# Patient Record
(demographics unavailable — no encounter records)

---

## 2017-03-27 NOTE — ED PDOC
HPI: General Adult


Time Seen by Provider: 17 18:13


Chief Complaint (Nursing): Dizziness/Lightheaded


Chief Complaint (Provider): Weakness/Vomiting


History Per: Patient


History/Exam Limitations: no limitations


Have you had recent travel within the past 21 days to any of the following 

countries: [place], [place], [place] or [place]?: No


Current Symptoms Are (Timing): Still Present


Additional Complaint(s): 


18:13





Theodore Patel is a 70 year old female with a history of hypertension, diabetes

, and uterine cancer, as well as a past surgical history of cholecystectomy 

presents to the ED with a chief complaint of vomiting, three episodes of which 

she experienced today. Her associated symptoms include headache, diffuse 

myalgias, lightheadedness, malaise, fatigue, and fever. She states that she has 

yet to taken any medications for her symptoms, denies any sick contacts or 

recent travel, as well as denies diarrhea, urinary symptoms, or respiratory 

symptoms. 





Past Medical History


Reviewed: Historical Data, Nursing Documentation, Vital Signs


Vital Signs: 


 Last Vital Signs











Temp  99 F   17 06:41


 


Pulse  83   17 09:46


 


Resp  20   17 09:46


 


BP  110/56 L  17 09:46


 


Pulse Ox  98   17 09:46














- Medical History


PMH: Diabetes, HTN


   Denies: HIV


Other PMH: uterine CA





- Surgical History


Surgical History: Cholecystectomy





- Family History


Family History: States: No Known Family Hx





- Social History


Current smoker - smoking cessation education provided: No


Alcohol: None


Drugs: Denies





- Home Medications


Home Medications: 


 Ambulatory Orders











 Medication  Instructions  Recorded


 


Ergocalciferol (Vitamin D2) 50,000 unit PO QWK 17





[Vitamin D2]  


 


Lisinopril [Zestril] 5 mg PO DAILY 17


 


Metformin HCl [Glucophage] 1,000 mg PO DAILY 17


 


Simvastatin [Zocor] 40 mg PO DAILY 17


 


Vitamin A/Vitamin D2 [Norwegian 50,000 mg PO DAILY 17





Cod Liver Oil Sfgl]  














- Allergies


Allergies/Adverse Reactions: 


 Allergies











Allergy/AdvReac Type Severity Reaction Status Date / Time


 


No Known Allergies Allergy   Verified 10/25/14 20:42














Review of Systems


Constitutional: Positive for: Fever, Weakness, Malaise, Other (fatigue,, 

diffuse myalgias)


Cardiovascular: Positive for: Light Headedness


Respiratory: Negative for: Other (no respiratory symptoms)


Gastrointestinal: Positive for: Vomiting (x3).  Negative for: Diarrhea


Genitourinary Female: Negative for: Other (no urinary symptoms)





Physical Exam





- Reviewed


Nursing Documentation Reviewed: Yes


Vital Signs Reviewed: Yes





- Physical Exam


Appears: Positive for: Uncomfortable, In Acute Distress


Head Exam: Positive for: ATRAUMATIC, NORMOCEPHALIC


Skin: Positive for: Warm, Dry.  Negative for: Rash


ENT: Positive for: Pharynx Is (clear), Other (dry mucous membranes)


Neck: Positive for: Painless ROM, Supple


Cardiovascular/Chest: Positive for: Chest Non Tender, Tachycardia (tachycardic 

with regular rhythm).  Negative for: Edema, Murmur


Respiratory: Positive for: Normal Breath Sounds (cleared to auscultation 

bilaterally).  Negative for: Rales, Rhonchi, Wheezing


Gastrointestinal/Abdominal: Positive for: Soft.  Negative for: Tenderness, Mass

, Distended, Guarding, Rebound


Back: Positive for: Normal Inspection.  Negative for: L CVA Tenderness, R CVA 

Tenderness


Extremity: Positive for: Normal ROM.  Negative for: Pedal Edema, Deformity


Lymphatic: Negative for: Adenopathy


Neurologic/Psych: Positive for: Alert, Oriented (x3).  Negative for: Motor/

Sensory Deficits





- Laboratory Results


Result Diagrams: 


 17 17:30





 17 08:10





- ECG


O2 Sat by Pulse Oximetry: 98 (RA)


Pulse Ox Interpretation: Normal





Medical Decision Making


Medical Decision Makin:30





Initial Impression: Vomiting and Fever vs. Sepsis vs. Dehydration vs. Gastritis 

vs. Gastroenteritis vs. Pancreatitis vs. Electrolyte Abnormalities vs. 

Hyperglycemia vs. DKA





Initial Plan:


* CBC


* Glucose


* Blood culture


* POC


* Urine culture


* Urinalysis


* Sodium Chloride 1000 mL at 1000 mLs/hr


* Tylenol 975 mg PO


* Zofran 8 mg IV


* Chest X-Ray


* EKG


* Reevaluation


* 


 Accession No. : [id number]


Patient Name / ID : GISELE JAIMES  / [id number]


Exam Date : 2017 22:10:26 ( Approved )


Study Comment : 


Sex / Age : F  / 070Y





Creator : TRAVON LARES


Dictator : 


 : 


Approver : TRAVON LARES


Approver2 : 





Report Date : 2017 22:39:00


My Comment : 


********************************************************************************

***





[facility] Division of Radiology  


 [address] [place] [address]  


 Tel. no. [phone]   


 


 


Patient Name: THEODORE PATEL            Account #: [id number]  


Pt. Address: [address] [place] [address] Rec #: [id number]  


                    [place] [address]            Ordering Dr: Edgard OQUENDO,

Ksenia LUCAS  


Pt Phone: [phone] CELL                             Order Location: [facility]  


: 1947 Female Age: 70            Order #: [id number]                   

                   


             Accession #: [id number]  


Reason for exam: vomiting   


 


 


 


 


 


 CT Scan  


 


 


ABD   PELVIS IV CONTRAST ONLY                              Exam Date: 17  


 


This imaging exam was performed at [facility]  


 


 


EXAM:  


   CT Abdomen and Pelvis With Intravenous Contrast.  


 


 CLINICAL HISTORY:  


   70 years old, female; Signs and symptoms; Nausea and vomiting; Prior surgery

;   


 Surgery date: 6+ months; Surgery type: Uterine ca. Cholecystectomy  


 


 TECHNIQUE:  


   Axial computed tomography images of the abdomen and pelvis with intravenous 

  


 contrast.  This CT exam was performed using one or more of the following dose 

  


 reduction techniques:  automated exposure control, adjustment of the mA and/or

   


 kV according to patient size, and/or use of iterative reconstruction 

technique.  


   Coronal and sagittal reformatted images were created and reviewed.  


 


 CONTRAST:  


   95 mL of [id number] administered intravenously.  


 


 COMPARISON:  


   CT - ABD   PELVIS PO CONTRAST ONLY 2014 8:44:55 PM  


 


 FINDINGS:  


   Lower thorax:  The heart as visualized appears mildly enlarged.  Mild   


 atelectasis and/or scarring in the lung bases.  


 


  ABDOMEN:  


   Liver:  There is a diffuse decrease in hepatic parenchymal density,   


 consistent with fatty infiltration.  There are no focal liver lesions present.

  


   Gallbladder and bile ducts:  There has been a cholecystectomy.  No ductal   


 dilation.  


   Pancreas:  1.6 CM cystic lesion in the body of the pancreas on series 3,   


 image 46.  However, this is little changed from 2014.  This suggests a   


 benign lesion.  The pancreas is somewhat atrophic and slightly fatty replaced.

  


   Spleen:  The spleen is normal.  


   Adrenals:  The adrenal glands are normal.  


   Kidneys and ureters:  The kidneys are normal.  No hydronephrosis.  


   Stomach and bowel:  Stomach is decompressed.  Colonic constipation is   


 present.  There is no evidence of intestinal obstruction.  No mucosal   


 thickening.  


   Appendix:  A normal appendix is identified.  


 


  PELVIS:  


   Bladder:  The bladder is normal.  


   Reproductive:  Uterus is atrophic.  


 


  ABDOMEN and PELVIS:  


   Intraperitoneal space:  There is no evidence of free intraperitoneal fluid. 

   


 There is no free intraperitoneal air.  


   Bones/joints:  There are moderate degenerative changes present.  There is   


 moderate diffuse osteopenia.  No acute fracture.  No dislocation.  


   Soft tissues:  There are small, left greater than right fat containing   


 inguinal hernias.  


   Vasculature:  The aorta is normal.  No abdominal aortic aneurysm.  


   Lymph nodes:  There are borderline bilateral inguinal nodes without sondra   


 adenopathy.  There is no evidence of lymphadenopathy.  


 


 IMPRESSION:       


 1.  The heart as visualized appears mildly enlarged.    


 2.  1.6 CM cystic lesion in the body of the pancreas on series 3, image 46.    


 However, this is little changed from 2014.  This suggests a benign 

lesion.   


 


 3.  Additional incidental and/or chronic findings as described.  


 


                                                            Dictated By:  

Travon Lares MD, MD      


                                                            Dictated Date/Time:

  17  


                                                            Signed By:  Travon Lares MD  


                                                            Date Signed: 2239  


                                                Transcribed By: UMAIR  


                                                            Transcribe Date/Time

: 17  


KHUSHI/GABY


 


Pt with marked leukocytosis on labs, initially elevated lactic acid (improving 

with IVF treatment.) Continues to have some weakness and nausea, as well as 

comorbidities with sepsis. Hospitalized to Dr Fournier (for PMD Dr Olsen) for 

continued IVF and bowel rest. ARSENIO Fournier @4620. ARSENIO pt and family findings and 

plan of care.





--------------------------------------------------------------------------------

----------------- 


Scribe Attestation:


Documented by Sujatha Roblero, acting as a scribe for Ksenia Rene MD.





Provider Scribe Attestation:


All medical record entries made by the Scribe were at my direction and 

personally dictated by me. I have reviewed the chart and agree that the record 

accurately reflects my personal performance of the history, physical exam, 

medical decision making, and the department course for this patient. I have 

also personally directed, reviewed, and agree with the discharge instructions 

and disposition.





Disposition





- Clinical Impression


Clinical Impression: 


 Vomiting, Sepsis


Counseled Patient/Family Regarding: Studies Performed, Diagnosis





- Disposition


Disposition Time: 23:00


Condition: GUARDED





- Pt Status Changed To:


Hospital Disposition Of: Observation





- POA


Present On Arrival: Poor Glycemic Control

## 2017-03-27 NOTE — CT
EXAM:

  CT Abdomen and Pelvis With Intravenous Contrast.



CLINICAL HISTORY:

  70 years old, female; Signs and symptoms; Nausea and vomiting; Prior surgery; 

Surgery date: 6+ months; Surgery type: Uterine ca. Cholecystectomy



TECHNIQUE:

  Axial computed tomography images of the abdomen and pelvis with intravenous 

contrast.  This CT exam was performed using one or more of the following dose 

reduction techniques:  automated exposure control, adjustment of the mA and/or 

kV according to patient size, and/or use of iterative reconstruction technique.

  Coronal and sagittal reformatted images were created and reviewed.



CONTRAST:

  95 mL of [id number] administered intravenously.



COMPARISON:

  CT - ABD   PELVIS PO CONTRAST ONLY 11/8/2014 8:44:55 PM



FINDINGS:

  Lower thorax:  The heart as visualized appears mildly enlarged.  Mild 

atelectasis and/or scarring in the lung bases.



 ABDOMEN:

  Liver:  There is a diffuse decrease in hepatic parenchymal density, 

consistent with fatty infiltration.  There are no focal liver lesions present.

  Gallbladder and bile ducts:  There has been a cholecystectomy.  No ductal 

dilation.

  Pancreas:  1.6 CM cystic lesion in the body of the pancreas on series 3, 

image 46.  However, this is little changed from 11/8/2014.  This suggests a 

benign lesion.  The pancreas is somewhat atrophic and slightly fatty replaced.

  Spleen:  The spleen is normal.

  Adrenals:  The adrenal glands are normal.

  Kidneys and ureters:  The kidneys are normal.  No hydronephrosis.

  Stomach and bowel:  Stomach is decompressed.  Colonic constipation is 

present.  There is no evidence of intestinal obstruction.  No mucosal 

thickening.

  Appendix:  A normal appendix is identified.



 PELVIS:

  Bladder:  The bladder is normal.

  Reproductive:  Uterus is atrophic.



 ABDOMEN and PELVIS:

  Intraperitoneal space:  There is no evidence of free intraperitoneal fluid.  

There is no free intraperitoneal air.

  Bones/joints:  There are moderate degenerative changes present.  There is 

moderate diffuse osteopenia.  No acute fracture.  No dislocation.

  Soft tissues:  There are small, left greater than right fat containing 

inguinal hernias.

  Vasculature:  The aorta is normal.  No abdominal aortic aneurysm.

  Lymph nodes:  There are borderline bilateral inguinal nodes without sondra 

adenopathy.  There is no evidence of lymphadenopathy.



IMPRESSION:     

1.  The heart as visualized appears mildly enlarged.  

2.  1.6 CM cystic lesion in the body of the pancreas on series 3, image 46.  

However, this is little changed from 11/8/2014.  This suggests a benign lesion. 

 

3.  Additional incidental and/or chronic findings as described.

## 2017-03-28 NOTE — RAD
HISTORY:

Sepsis Patient  



COMPARISON:

Comparison is made to the previous study dated 04/29/2014 



FINDINGS:



LUNGS:

No evidence of new infiltrate or consolidation in the lungs.



PLEURA:

No significant pleural effusion identified, no pneumothorax apparent.



CARDIOVASCULAR:

The cardiac silhouette is mildly enlarged.



OSSEOUS STRUCTURES:

No significant abnormalities.



VISUALIZED UPPER ABDOMEN:

Normal.



OTHER FINDINGS:

None.



IMPRESSION:

No active disease. No significant interval change.

## 2017-03-28 NOTE — CP.PCM.HP
History of Present Illness





- History of Present Illness


History of Present Illness: 


CC:  Vomiting/Fever.





69 y/o F evaluated by ER doctor at [facility] for Vomiting  x3 on DOA with no relief 

of symptoms.





Pt was brought by family c/o of vomiting x 3 associated to fever and chills (in 

ER TMAx 101.6), also associated to headache, lightheadedness ( BS finger stick 

313, ).   Worsening symptoms:  Weakness, dizziness.


Aggravating factor: As per family, she took extra dose of Metformin on DOA.





As per daughter; Pt had consult with her PMD on DOA in AM and her Metformin 

medication was changed from 500 to 1000 mg, when she get home she took both 

doses and since that moment she begins feeling bad and vomiting start, after 

she vomiting for 3rd time, and felt she had tactile fever, she bring her mother

  to ER [facility] and after evaluation Pt was admitted.





Pt denied: Diarrhea, abdominal pain, CP, SOB, cough, urinary symptoms, sick 

contact, recent travel.





PMHx:  HTN, DMII, Uterine Ca.





CXR shows:  No active disease.   CT Abd/Pelv:  1.6 cm cystic lesion in the body 

of the pancreas, there is a little change from CT of 11/08/14, this suggests a 

benign lesion.


EKG= Sinus Tachycardia.











Present on Admission





- Present on Admission


Any Indicators Present on Admission: Yes


History of Uncontrolled Diabetes: Yes





Review of Systems





- Constitutional


Constitutional: Chills, Fever, Headache, Weakness





- EENT


Eyes: Other (negative)


Ears: Other (negative)


Nose/Mouth/Throat: Other (negative)





- Cardiovascular


Cardiovascular: Lightheadedness, Rapid Heart Rate.  absent: Pedal Edema





- Respiratory


Respiratory: Other (negative)





- Gastrointestinal


Gastrointestinal: Nausea, Vomiting





- Genitourinary


Genitourinary: Other (negative)





- Musculoskeletal


Musculoskeletal: Other (negative)





- Integumentary


Integumentary: Other (negative)





- Neurological


Neurological: Other (negative)





- Psychiatric


Psychiatric: Other (negative)





- Endocrine


Endocrine: Other (negative)





- Hematologic/Lymphatic


Hematologic: Other (negative)





Past Patient History





- Past Medical History & Family History


Past Medical History?: Yes


Pertinent Family History: 


unknown





- Past Social History


Smoking Status: Never Smoked


Alcohol: None


Drugs: Denies


Home Situation {Lives}: With Family





- CARDIAC


Hx Cardiac Disorders: Yes


Hx Hypertension: Yes





- PULMONARY


Hx Respiratory Disorders: No





- NEUROLOGICAL


Hx Neurological Disorder: No





- HEENT


Hx HEENT Problems: No





- RENAL


Hx Chronic Kidney Disease: No





- ENDOCRINE/METABOLIC


Hx Endocrine Disorders: Yes


Hx Diabetes Mellitus Type 2: Yes





- HEMATOLOGICAL/ONCOLOGICAL


Hx Cancer: Yes (Uterine)


Hx Human Immunodeficiency Virus (HIV): No





- INTEGUMENTARY


Hx Dermatological Problems: No





- MUSCULOSKELETAL/RHEUMATOLOGICAL


Hx Musculoskeletal Disorders: No





- GASTROINTESTINAL


Hx Gastrointestinal Disorders: No





- GENITOURINARY/GYNECOLOGICAL


Hx Genitourinary Disorders: No





- PSYCHIATRIC


Hx Psychophysiologic Disorder: No





- SURGICAL HISTORY


Hx Surgeries: Yes


Hx Cholecystectomy: Yes





- ANESTHESIA


Hx Anesthesia: Yes


Hx Anesthesia Reactions: No





Meds


Home Medications: 


 Home Medication List











 Medication  Instructions  Recorded  Confirmed  Type


 


Ciprofloxacin HCl [Cipro] 500 mg PO BID #10 tablet 03/31/17  Rx


 


Lactobacillus Acidophilus [Bacid 1 cap PO BID #10 cap 03/31/17  Rx





Acidophilus]    











Allergies/Adverse Reactions: 


 Allergies











Allergy/AdvReac Type Severity Reaction Status Date / Time


 


No Known Allergies Allergy   Verified 10/25/14 20:42














Physical Exam





- Constitutional


Appears: No Acute Distress





- Head Exam


Head Exam: NORMAL INSPECTION





- Eye Exam


Eye Exam: PERRL





- ENT Exam


ENT Exam: Normal Oropharynx





- Neck Exam


Neck exam: Positive for: Normal Inspection





- Respiratory Exam


Respiratory Exam: NORMAL BREATHING PATTERN





- Cardiovascular Exam


Cardiovascular Exam: REGULAR RHYTHM





- GI/Abdominal Exam


GI & Abdominal Exam: Normal Bowel Sounds, Soft





- Extremities Exam


Extremities exam: Positive for: normal inspection





- Back Exam


Back exam: NORMAL INSPECTION





- Neurological Exam


Neurological exam: Alert, Oriented x3


Additional comments: 


Follows commands.





- Psychiatric Exam


Psychiatric exam: Normal Mood





- Skin


Skin Exam: Warm





Results





- Vital Signs


Recent Vital Signs: 





 Last Vital Signs











Temp  99 F   03/28/17 06:41


 


Pulse  82   03/28/17 14:12


 


Resp  20   03/28/17 14:12


 


BP  110/56 L  03/28/17 14:12


 


Pulse Ox  98   03/28/17 14:12








reviewed JELIZABETH





- Labs


Result Diagrams: 


 03/30/17 05:50





 03/30/17 05:50


Labs: 





 Laboratory Results - last 24 hr











  03/28/17





  08:10


 


Sodium  141


 


Potassium  3.7


 


Chloride  105


 


Carbon Dioxide  26


 


Anion Gap  14


 


BUN  14


 


Creatinine  0.5 L


 


Est GFR ( Amer)  > 60


 


Est GFR (Non-Af Amer)  > 60


 


Random Glucose  210 H


 


Calcium  8.3 L


 


Total Bilirubin  0.6


 


AST  17


 


ALT  21


 


Alkaline Phosphatase  80


 


Total Protein  6.4


 


Albumin  3.0 L D


 


Globulin  3.4


 


Albumin/Globulin Ratio  0.9 L


 


Triglycerides  61  D


 


Cholesterol  106


 


LDL Cholesterol Direct  55


 


HDL Cholesterol  32


 


Thyroxine (T4)  4.15 L


 


Total T3  0.591 L


 


TSH 3rd Generation  2.06








reviewed J.P.





- EKG Data


EKG comments: 


reviewed J.P.





- Imaging and Cardiology


  ** Chest x-ray


Status: Report reviewed by me (ANDREA)





  ** CT scan - abdomen


Status: Report reviewed by me (NADREA)





  ** CT scan - pelvis


Status: Report reviewed by me (ANDREA)





Assessment & Plan





- Assessment and Plan (Free Text)


Assessment: 


Sepsis  Acute high





Vomiting  Acute high





Fever  Acute high





DMII  Chronic high





HTN Chronic.





Plan: 


Continue Zosyn IV, Zofram, Zestril, Lipitor and rest of Tx, f/u Blood C-S, U C-

S.








- Date & Time


Date: 03/28/17


Time: 12:00

## 2017-03-28 NOTE — CARD
--------------- APPROVED REPORT --------------





EKG Measurement

Heart [id number]

DE 158P23

[id number]

[id number]

BSg376



<Conclusion>

Sinus tachycardia

Possible Left atrial enlargement

Nonspecific ST and T wave abnormality

Abnormal ECG

## 2017-03-29 NOTE — CP.PCM.PN
Subjective





- Date & Time of Evaluation


Date of Evaluation: 03/29/17





- Subjective


Subjective: 


F/U  Sepsis.





Pt c/o of nausea, headache, no vomiting, no abdominal pain.











Objective





- Vital Signs/Intake and Output


Vital Signs (last 24 hours): 


 











Temp Pulse Resp BP Pulse Ox


 


 98.6 F   78   20   98/58 L  95 


 


 03/29/17 07:47  03/29/17 10:43  03/29/17 07:47  03/29/17 07:47  03/29/17 10:43











- Medications


Medications: 


 Current Medications





Acetaminophen (Tylenol 325mg Tab)  975 mg PO ONCE PRN


   PRN Reason: Fever >100.4 F


   Last Admin: 03/28/17 03:47 Dose:  975 mg


Acetaminophen (Tylenol 325mg Tab)  650 mg PO Q4 PRN


   PRN Reason: Pain, Mild (1-3)


   Last Admin: 03/29/17 03:58 Dose:  650 mg


Atorvastatin Calcium (Lipitor)  20 mg PO DAILY [facility]


   Last Admin: 03/29/17 09:12 Dose:  20 mg


Enoxaparin Sodium (Lovenox)  40 mg SC DAILY [facility]


   PRN Reason: Protocol


Ergocalciferol (Drisdol 50,000 Intl Units Cap)  1 cap PO QWK [facility]


Piperacillin Sod/Tazobactam (Sod 3.375 gm/ Sodium Chloride)  100 mls @ 100 mls/

hr IVPB Q6 [facility]


   Last Admin: 03/29/17 09:14 Dose:  100 mls/hr


Sodium Chloride (Sodium Chloride 0.45%)  1,000 mls @ 80 mls/hr IV .[id number] [facility]


   Stop: 03/29/17 16:46


   Last Admin: 03/29/17 05:15 Dose:  Not Given


Ciprofloxacin (Cipro 400mg/200ml Dsw)  200 mls @ 200 mls/hr IVPB Q12 [facility]


   Last Admin: 03/29/17 10:35 Dose:  200 mls/hr


Insulin Human Lispro (Humalog)  0 units SC ACCU-CHECK [facility]


   PRN Reason: Protocol


   Last Admin: 03/29/17 07:28 Dose:  Not Given


Lisinopril (Zestril)  5 mg PO DAILY [facility]


   Last Admin: 03/29/17 09:13 Dose:  Not Given


Ondansetron HCl (Zofran Inj)  4 mg IVP Q4 PRN


   PRN Reason: Nausea/Vomiting


   Last Admin: 03/29/17 00:42 Dose:  4 mg











- Labs


Labs: 


 





 03/29/17 06:35 





 03/29/17 06:35 





 











PT  10.3 SECONDS (9.6-11.2)   03/27/17  17:30    


 


INR  0.99  (0.92-1.08)   03/27/17  17:30    


 


APTT  26.1 SECONDS (23.3-32.5)   03/27/17  17:30    














- Constitutional


Appears: No Acute Distress





- Head Exam


Head Exam: NORMAL INSPECTION





- Eye Exam


Eye Exam: PERRL





- ENT Exam


ENT Exam: Normal Oropharynx





- Neck Exam


Neck Exam: Normal Inspection





- Respiratory Exam


Respiratory Exam: NORMAL BREATHING PATTERN





- Cardiovascular Exam


Cardiovascular Exam: REGULAR RHYTHM





- GI/Abdominal Exam


GI & Abdominal Exam: Soft, Normal Bowel Sounds





- Extremities Exam


Extremities Exam: Normal Inspection





- Back Exam


Back Exam: NORMAL INSPECTION





- Neurological Exam


Neurological Exam: Alert, Oriented x3


Additional comments: 


Follows commands.





- Psychiatric Exam


Psychiatric exam: Normal Mood





- Skin


Skin Exam: Warm





Assessment and Plan





- Assessment and Plan (Free Text)


Assessment: 


Sepsis  Acute high





Vomiting Resolving





Fever  Resolved.





DMII Chronic





HTN  Chronic











Plan: 


Continue Cipro, Zosyn, Tylenol, Zestril, Insulin and rest of Tx.  f/u GI 

consult.

## 2017-03-30 NOTE — CP.PCM.PN
Subjective





- Date & Time of Evaluation


Date of Evaluation: 03/30/17


Time of Evaluation: 11:00





- Subjective


Subjective: 


F/U  Sepsis.





Feeling better, Patient wants to eat, denies  N/V abdominal pain





Objective





- Vital Signs/Intake and Output


Vital Signs (last 24 hours): 


 











Temp Pulse Resp BP Pulse Ox


 


 99.3 F   100 H  20   108/65   96 


 


 03/30/17 08:01  03/30/17 09:41  03/30/17 08:01  03/30/17 09:41  03/30/17 08:01











- Medications


Medications: 


 Current Medications





Acetaminophen (Tylenol 325mg Tab)  975 mg PO ONCE PRN


   PRN Reason: Fever >100.4 F


   Last Admin: 03/28/17 03:47 Dose:  975 mg


Acetaminophen (Tylenol 325mg Tab)  650 mg PO Q4 PRN


   PRN Reason: Pain, Mild (1-3)


   Last Admin: 03/29/17 03:58 Dose:  650 mg


Atorvastatin Calcium (Lipitor)  20 mg PO DAILY [facility]


   Last Admin: 03/30/17 09:39 Dose:  20 mg


Enoxaparin Sodium (Lovenox)  40 mg SC DAILY [facility]


   PRN Reason: Protocol


   Last Admin: 03/30/17 09:39 Dose:  40 mg


Ergocalciferol (Drisdol 50,000 Intl Units Cap)  1 cap PO QWK [facility]


Piperacillin Sod/Tazobactam (Sod 3.375 gm/ Sodium Chloride)  100 mls @ 100 mls/

hr IVPB Q6 [facility]


   Last Admin: 03/30/17 09:42 Dose:  100 mls/hr


Ciprofloxacin (Cipro 400mg/200ml Dsw)  200 mls @ 200 mls/hr IVPB Q12 [facility]


   Last Admin: 03/30/17 09:38 Dose:  200 mls/hr


Insulin Human Lispro (Humalog)  0 units SC ACCU-CHECK [facility]


   PRN Reason: Protocol


   Last Admin: 03/30/17 12:17 Dose:  2 u


Lisinopril (Zestril)  5 mg PO DAILY [facility]


   Last Admin: 03/30/17 09:41 Dose:  5 mg


Ondansetron HCl (Zofran Inj)  4 mg IVP Q4 PRN


   PRN Reason: Nausea/Vomiting


   Last Admin: 03/29/17 00:42 Dose:  4 mg











- Labs


Labs: 


 





 03/30/17 05:50 





 03/30/17 05:50 





 











PT  10.3 SECONDS (9.6-11.2)   03/27/17  17:30    


 


INR  0.99  (0.92-1.08)   03/27/17  17:30    


 


APTT  26.1 SECONDS (23.3-32.5)   03/27/17  17:30    














- Constitutional


Appears: No Acute Distress





- Head Exam


Head Exam: NORMAL INSPECTION





- Eye Exam


Pupil Exam: PERRL





- ENT Exam


ENT Exam: Normal Exam





- Neck Exam


Neck Exam: Normal Inspection





- Respiratory Exam


Respiratory Exam: NORMAL BREATHING PATTERN





- Cardiovascular Exam


Cardiovascular Exam: REGULAR RHYTHM, Murmur (1/6 LSB)





- GI/Abdominal Exam


GI & Abdominal Exam: Soft, Normal Bowel Sounds





- Extremities Exam


Extremities Exam: Normal Inspection





- Back Exam


Back Exam: NORMAL INSPECTION





- Neurological Exam


Neurological Exam: Alert


Additional comments: 


no focal motor/sensory deficit





- Psychiatric Exam


Psychiatric exam: Normal Affect, Normal Mood





- Skin


Skin Exam: Warm





Assessment and Plan





- Assessment and Plan (Free Text)


Assessment: 


Sepsis Acute High improved 





DM  Chronic High





HTN Chronic medium


Plan: 


wbc  11.3 , wbc 4.8 , blood C-S x 2 negative , U C-S prob contamination, 

increase diet, continue Cipro , Zosyn , U C-S straight sath , BS control

## 2017-03-31 NOTE — CP.PCM.PN
Subjective





- Date & Time of Evaluation


Date of Evaluation: 03/31/17


Time of Evaluation: 12:40





- Subjective


Subjective: 


F/U Sepsis





Pt awake, no A/D, no abdominal pain, no n/v/d, no0 fever, diet taken well.





Objective





- Vital Signs/Intake and Output


Vital Signs (last 24 hours): 


 











Temp Pulse Resp BP Pulse Ox


 


 98.5 F   79   18   116/60   96 


 


 03/31/17 08:45  03/31/17 09:54  03/31/17 08:45  03/31/17 09:54  03/31/17 08:45











- Medications


Medications: 


 Current Medications





Acetaminophen (Tylenol 325mg Tab)  975 mg PO ONCE PRN


   PRN Reason: Fever >100.4 F


   Last Admin: 03/28/17 03:47 Dose:  975 mg


Acetaminophen (Tylenol 325mg Tab)  650 mg PO Q4 PRN


   PRN Reason: Pain, Mild (1-3)


   Last Admin: 03/29/17 03:58 Dose:  650 mg


Atorvastatin Calcium (Lipitor)  20 mg PO DAILY [facility]


   Last Admin: 03/30/17 09:39 Dose:  20 mg


Enoxaparin Sodium (Lovenox)  40 mg SC DAILY [facility]


   PRN Reason: Protocol


   Last Admin: 03/31/17 09:54 Dose:  40 mg


Ergocalciferol (Drisdol 50,000 Intl Units Cap)  1 cap PO QWK [facility]


Piperacillin Sod/Tazobactam (Sod 3.375 gm/ Sodium Chloride)  100 mls @ 100 mls/

hr IVPB Q6 [facility]


   Last Admin: 03/31/17 09:46 Dose:  100 mls/hr


Ciprofloxacin (Cipro 400mg/200ml Dsw)  200 mls @ 200 mls/hr IVPB Q12 [facility]


   Last Admin: 03/31/17 09:44 Dose:  200 mls/hr


Insulin Human Lispro (Humalog)  0 units SC ACCU-CHECK [facility]


   PRN Reason: Protocol


   Last Admin: 03/31/17 06:52 Dose:  1 u


Lisinopril (Zestril)  5 mg PO DAILY [facility]


   Last Admin: 03/31/17 09:54 Dose:  5 mg


Metformin HCl (Glucophage)  500 mg PO BIDWM [facility]


   Last Admin: 03/31/17 08:00 Dose:  500 mg


Ondansetron HCl (Zofran Inj)  4 mg IVP Q4 PRN


   PRN Reason: Nausea/Vomiting


   Last Admin: 03/29/17 00:42 Dose:  4 mg











- Labs


Labs: 


 





 03/30/17 05:50 





 03/30/17 05:50 





 











PT  10.3 SECONDS (9.6-11.2)   03/27/17  17:30    


 


INR  0.99  (0.92-1.08)   03/27/17  17:30    


 


APTT  26.1 SECONDS (23.3-32.5)   03/27/17  17:30    














- Constitutional


Appears: No Acute Distress





- Head Exam


Head Exam: NORMAL INSPECTION





- Eye Exam


Eye Exam: PERRL





- ENT Exam


ENT Exam: Normal Exam





- Neck Exam


Neck Exam: Normal Inspection





- Respiratory Exam


Respiratory Exam: NORMAL BREATHING PATTERN





- Cardiovascular Exam


Cardiovascular Exam: REGULAR RHYTHM, Murmur (systolic 1/6 LSB)





- GI/Abdominal Exam


GI & Abdominal Exam: Soft, Normal Bowel Sounds





- Extremities Exam


Extremities Exam: Normal Inspection





- Back Exam


Back Exam: NORMAL INSPECTION





- Neurological Exam


Neurological Exam: Alert, Oriented x3


Additional comments: 


No focal motor sensory deficit.





- Psychiatric Exam


Psychiatric exam: Normal Affect, Normal Mood





- Skin


Skin Exam: Normal Color, Warm





Assessment and Plan





- Assessment and Plan (Free Text)


Assessment: 


Sepsis  Improved.





DMII  Chronic





HTN  Chronic.





Plan: 


Pt improved and stable to be discharged, see intruction medication sheet, f/u 

with PMD in a week.

## 2018-04-16 NOTE — CARD
--------------- APPROVED REPORT --------------





EKG Measurement

Heart [id number]

[id number]

[id number]

[id number]

[id number]



<Conclusion>

Normal sinus rhythm

Normal ECG

## 2018-04-16 NOTE — ED PDOC
Hyperglycemia/Hypoglycemia


Chief Complaint (Nursing): Dizziness/Lightheaded


***: The patient does not have any of the infectious symptoms listed except for 

those marked.





Past Medical History


Vital Signs: 


 Last Vital Signs











Temp  98.3 F   04/15/18 23:54


 


Pulse  73   04/15/18 23:54


 


Resp  16   04/15/18 23:54


 


BP  131/75   04/15/18 23:54


 


Pulse Ox  98   04/15/18 23:54














- Medical History


PMH: Diabetes, HTN


   Denies: HIV, Chronic Kidney Disease





- Surgical History


Surgical History: Cholecystectomy





- Family History


Family History: States: Unknown Family Hx





- Home Medications


Home Medications: 


 Ambulatory Orders











 Medication  Instructions  Recorded


 


Ergocalciferol (Vitamin D2) 50,000 unit PO QWK 03/28/17





[Vitamin D2]  


 


Lisinopril [Zestril] 5 mg PO DAILY 03/28/17


 


Metformin HCl [Glucophage] 1,000 mg PO DAILY 03/28/17


 


Simvastatin [Zocor] 40 mg PO DAILY 03/28/17


 


Vitamin A/Vitamin D2 [Norwegian 50,000 mg PO DAILY 03/28/17





Cod Liver Oil Sfgl]  


 


Ciprofloxacin HCl [Cipro] 500 mg PO BID #10 tablet 03/31/17


 


Lactobacillus Acidophilus [Bacid 1 cap PO BID #10 cap 03/31/17





Acidophilus]  














- Allergies


Allergies/Adverse Reactions: 


 Allergies











Allergy/AdvReac Type Severity Reaction Status Date / Time


 


No Known Allergies Allergy   Verified 04/15/18 23:54














Physical Exam





- Reviewed


Nursing Documentation Reviewed: Yes


Vital Signs Reviewed: Yes





- Physical Exam


Appears: Positive for: Well


Head Exam: Positive for: ATRAUMATIC


Skin: Positive for: Normal Color


Eye Exam: Positive for: Normal appearance, EOMI, PERRL.  Negative for: Nystagmus

, Periorbital swelling, Periorbital tenderness, Conjunctival injection, Scleral 

icterus


Neck: Positive for: Normal.  Negative for: Painless ROM, Supple, Decreased ROM, 

Limited ROM


Cardiovascular/Chest: Positive for: Regular Rate, Rhythm, Chest Non Tender.  

Negative for: Edema, Gallop, Murmur, Bradycardia, Tachycardia, Friction Rub


Respiratory: Positive for: Normal Breath Sounds.  Negative for: Decreased 

Breath Sounds, Accessory Muscle Use, Crackles, Rales, Rhonchi, Stridor, Wheezing

, Respiratory Distress


Pulses-Carotid (L): 2+


Pulses-Carotid (R): 2+


Pulses-Radial (L): 2+


Pulses-Radial (R): 2+


Gastrointestinal/Abdominal: Positive for: Normal Exam, Bowel Sounds, Soft


Lymphatic: Positive for: Deferred


Neurologic/Psych: Positive for: Alert





- Laboratory Results


Result Diagrams: 


 04/16/18 01:01





 04/16/18 01:01





- ECG


O2 Sat by Pulse Oximetry: 98





Medical Decision Making


Medical Decision Making: 





Blood sugar at presentation is 455


blood sugar after metfomin and fluids is 203





pt will be discharged with instructions to follow up with her pmd





Disposition





- Clinical Impression


Clinical Impression: 


 Hyperglycemia








- Patient ED Disposition


Is Patient to be Admitted: No


Doctor Will See Patient In The: Office


Counseled Patient/Family Regarding: Studies Performed, Diagnosis, Need For 

Followup





- Disposition


Disposition: Routine/Home


Disposition Time: 03:54


Condition: GOOD


Instructions:  Hyperglycemia, Adult, The ABCs of Diabetes, Hyperglycemia, Adult 

(DC)


Forms:  [employer] (English)